# Patient Record
Sex: FEMALE | Race: WHITE | Employment: FULL TIME | URBAN - METROPOLITAN AREA
[De-identification: names, ages, dates, MRNs, and addresses within clinical notes are randomized per-mention and may not be internally consistent; named-entity substitution may affect disease eponyms.]

---

## 2018-11-16 ENCOUNTER — HOSPITAL ENCOUNTER (OUTPATIENT)
Dept: ULTRASOUND IMAGING | Age: 43
Discharge: HOME | End: 2018-11-16
Payer: COMMERCIAL

## 2018-11-16 DIAGNOSIS — R10.9 FLANK PAIN: ICD-10-CM

## 2018-11-16 PROCEDURE — 76770 US EXAM ABDO BACK WALL COMP: CPT

## 2020-03-05 ENCOUNTER — HOSPITAL ENCOUNTER (OUTPATIENT)
Dept: ULTRASOUND IMAGING | Age: 45
Discharge: HOME | End: 2020-03-05
Payer: COMMERCIAL

## 2020-03-05 PROCEDURE — 76770 US EXAM ABDO BACK WALL COMP: CPT

## 2020-07-06 ENCOUNTER — HOSPITAL ENCOUNTER (INPATIENT)
Age: 45
LOS: 3 days | Discharge: HOME | DRG: 897 | End: 2020-07-09
Attending: EMERGENCY MEDICINE | Admitting: INTERNAL MEDICINE
Payer: COMMERCIAL

## 2020-07-06 PROBLEM — F10.239 ALCOHOL DEPENDENCE WITH WITHDRAWAL (HCC): Status: ACTIVE | Noted: 2020-07-06

## 2020-07-06 LAB
A/G RATIO: 2.1 (ref 1.1–2.2)
ACETAMINOPHEN LEVEL: <5 UG/ML (ref 10–30)
ALBUMIN SERPL-MCNC: 4.6 G/DL (ref 3.4–5)
ALP BLD-CCNC: 119 U/L (ref 40–129)
ALT SERPL-CCNC: 96 U/L (ref 10–40)
AMPHETAMINE SCREEN, URINE: ABNORMAL
ANION GAP SERPL CALCULATED.3IONS-SCNC: 14 MMOL/L (ref 3–16)
AST SERPL-CCNC: 84 U/L (ref 15–37)
BACTERIA: ABNORMAL /HPF
BARBITURATE SCREEN URINE: ABNORMAL
BASOPHILS ABSOLUTE: 0.1 K/UL (ref 0–0.2)
BASOPHILS RELATIVE PERCENT: 0.9 %
BENZODIAZEPINE SCREEN, URINE: ABNORMAL
BILIRUB SERPL-MCNC: 0.3 MG/DL (ref 0–1)
BILIRUBIN URINE: NEGATIVE
BLOOD, URINE: NEGATIVE
BUN BLDV-MCNC: 9 MG/DL (ref 7–20)
CALCIUM SERPL-MCNC: 9 MG/DL (ref 8.3–10.6)
CANNABINOID SCREEN URINE: POSITIVE
CHLORIDE BLD-SCNC: 100 MMOL/L (ref 99–110)
CLARITY: CLEAR
CO2: 21 MMOL/L (ref 21–32)
COCAINE METABOLITE SCREEN URINE: ABNORMAL
COLOR: YELLOW
CREAT SERPL-MCNC: 0.7 MG/DL (ref 0.6–1.1)
EOSINOPHILS ABSOLUTE: 0.3 K/UL (ref 0–0.6)
EOSINOPHILS RELATIVE PERCENT: 4.4 %
EPITHELIAL CELLS, UA: ABNORMAL /HPF (ref 0–5)
ETHANOL: 122 MG/DL (ref 0–0.08)
ETHANOL: 34 MG/DL (ref 0–0.08)
GFR AFRICAN AMERICAN: >60
GFR NON-AFRICAN AMERICAN: >60
GLOBULIN: 2.2 G/DL
GLUCOSE BLD-MCNC: 102 MG/DL (ref 70–99)
GLUCOSE URINE: NEGATIVE MG/DL
HCT VFR BLD CALC: 41.8 % (ref 36–48)
HEMOGLOBIN: 14.2 G/DL (ref 12–16)
KETONES, URINE: NEGATIVE MG/DL
LEUKOCYTE ESTERASE, URINE: ABNORMAL
LYMPHOCYTES ABSOLUTE: 1.6 K/UL (ref 1–5.1)
LYMPHOCYTES RELATIVE PERCENT: 22 %
Lab: ABNORMAL
MCH RBC QN AUTO: 30.7 PG (ref 26–34)
MCHC RBC AUTO-ENTMCNC: 33.9 G/DL (ref 31–36)
MCV RBC AUTO: 90.4 FL (ref 80–100)
METHADONE SCREEN, URINE: ABNORMAL
MICROSCOPIC EXAMINATION: YES
MONOCYTES ABSOLUTE: 0.6 K/UL (ref 0–1.3)
MONOCYTES RELATIVE PERCENT: 8.1 %
NEUTROPHILS ABSOLUTE: 4.6 K/UL (ref 1.7–7.7)
NEUTROPHILS RELATIVE PERCENT: 64.6 %
NITRITE, URINE: NEGATIVE
OPIATE SCREEN URINE: ABNORMAL
OXYCODONE URINE: ABNORMAL
PDW BLD-RTO: 12.7 % (ref 12.4–15.4)
PH UA: 7
PH UA: 7 (ref 5–8)
PHENCYCLIDINE SCREEN URINE: ABNORMAL
PLATELET # BLD: 252 K/UL (ref 135–450)
PMV BLD AUTO: 9.6 FL (ref 5–10.5)
POTASSIUM SERPL-SCNC: 3.8 MMOL/L (ref 3.5–5.1)
PROPOXYPHENE SCREEN: ABNORMAL
PROTEIN UA: NEGATIVE MG/DL
RBC # BLD: 4.63 M/UL (ref 4–5.2)
RBC UA: ABNORMAL /HPF (ref 0–4)
SALICYLATE, SERUM: <0.3 MG/DL (ref 15–30)
SODIUM BLD-SCNC: 135 MMOL/L (ref 136–145)
SPECIFIC GRAVITY UA: 1.02 (ref 1–1.03)
TOTAL PROTEIN: 6.8 G/DL (ref 6.4–8.2)
URINE REFLEX TO CULTURE: ABNORMAL
URINE TYPE: ABNORMAL
UROBILINOGEN, URINE: 0.2 E.U./DL
WBC # BLD: 7.2 K/UL (ref 4–11)
WBC UA: ABNORMAL /HPF (ref 0–5)

## 2020-07-06 PROCEDURE — 80053 COMPREHEN METABOLIC PANEL: CPT

## 2020-07-06 PROCEDURE — 85025 COMPLETE CBC W/AUTO DIFF WBC: CPT

## 2020-07-06 PROCEDURE — 6370000000 HC RX 637 (ALT 250 FOR IP): Performed by: PHYSICIAN ASSISTANT

## 2020-07-06 PROCEDURE — G0480 DRUG TEST DEF 1-7 CLASSES: HCPCS

## 2020-07-06 PROCEDURE — 1200000000 HC SEMI PRIVATE

## 2020-07-06 PROCEDURE — 36415 COLL VENOUS BLD VENIPUNCTURE: CPT

## 2020-07-06 PROCEDURE — 81001 URINALYSIS AUTO W/SCOPE: CPT

## 2020-07-06 PROCEDURE — 80307 DRUG TEST PRSMV CHEM ANLYZR: CPT

## 2020-07-06 PROCEDURE — 99285 EMERGENCY DEPT VISIT HI MDM: CPT

## 2020-07-06 PROCEDURE — 85610 PROTHROMBIN TIME: CPT

## 2020-07-06 RX ORDER — LORAZEPAM 1 MG/1
4 TABLET ORAL
Status: DISCONTINUED | OUTPATIENT
Start: 2020-07-06 | End: 2020-07-07

## 2020-07-06 RX ORDER — LORAZEPAM 1 MG/1
3 TABLET ORAL
Status: DISCONTINUED | OUTPATIENT
Start: 2020-07-06 | End: 2020-07-07

## 2020-07-06 RX ORDER — SODIUM CHLORIDE 0.9 % (FLUSH) 0.9 %
10 SYRINGE (ML) INJECTION PRN
Status: DISCONTINUED | OUTPATIENT
Start: 2020-07-06 | End: 2020-07-07

## 2020-07-06 RX ORDER — SODIUM CHLORIDE 0.9 % (FLUSH) 0.9 %
10 SYRINGE (ML) INJECTION EVERY 12 HOURS SCHEDULED
Status: DISCONTINUED | OUTPATIENT
Start: 2020-07-06 | End: 2020-07-07

## 2020-07-06 RX ORDER — LORAZEPAM 1 MG/1
2 TABLET ORAL
Status: DISCONTINUED | OUTPATIENT
Start: 2020-07-06 | End: 2020-07-07

## 2020-07-06 RX ORDER — LORAZEPAM 2 MG/ML
1 INJECTION INTRAMUSCULAR
Status: DISCONTINUED | OUTPATIENT
Start: 2020-07-06 | End: 2020-07-07

## 2020-07-06 RX ORDER — LORAZEPAM 1 MG/1
1 TABLET ORAL
Status: DISCONTINUED | OUTPATIENT
Start: 2020-07-06 | End: 2020-07-07

## 2020-07-06 RX ORDER — LORAZEPAM 2 MG/ML
3 INJECTION INTRAMUSCULAR
Status: DISCONTINUED | OUTPATIENT
Start: 2020-07-06 | End: 2020-07-07

## 2020-07-06 RX ORDER — FOLIC ACID 1 MG/1
1 TABLET ORAL DAILY
Status: DISCONTINUED | OUTPATIENT
Start: 2020-07-06 | End: 2020-07-09 | Stop reason: HOSPADM

## 2020-07-06 RX ORDER — M-VIT,TX,IRON,MINS/CALC/FOLIC 27MG-0.4MG
1 TABLET ORAL DAILY
Status: DISCONTINUED | OUTPATIENT
Start: 2020-07-06 | End: 2020-07-09 | Stop reason: HOSPADM

## 2020-07-06 RX ORDER — LORAZEPAM 2 MG/ML
2 INJECTION INTRAMUSCULAR
Status: DISCONTINUED | OUTPATIENT
Start: 2020-07-06 | End: 2020-07-07

## 2020-07-06 RX ORDER — LORAZEPAM 2 MG/ML
4 INJECTION INTRAMUSCULAR
Status: DISCONTINUED | OUTPATIENT
Start: 2020-07-06 | End: 2020-07-07

## 2020-07-06 RX ORDER — CHLORDIAZEPOXIDE HYDROCHLORIDE 5 MG/1
10 CAPSULE, GELATIN COATED ORAL 4 TIMES DAILY
Status: DISCONTINUED | OUTPATIENT
Start: 2020-07-06 | End: 2020-07-09 | Stop reason: HOSPADM

## 2020-07-06 RX ORDER — THIAMINE MONONITRATE (VIT B1) 100 MG
100 TABLET ORAL DAILY
Status: DISCONTINUED | OUTPATIENT
Start: 2020-07-06 | End: 2020-07-09 | Stop reason: HOSPADM

## 2020-07-06 RX ADMIN — Medication 100 MG: at 19:33

## 2020-07-06 RX ADMIN — MULTIPLE VITAMINS W/ MINERALS TAB 1 TABLET: TAB at 19:33

## 2020-07-06 RX ADMIN — FOLIC ACID 1 MG: 1 TABLET ORAL at 19:34

## 2020-07-06 ASSESSMENT — PAIN DESCRIPTION - LOCATION: LOCATION: BACK

## 2020-07-06 ASSESSMENT — PAIN SCALES - GENERAL: PAINLEVEL_OUTOF10: 7

## 2020-07-06 ASSESSMENT — PAIN DESCRIPTION - DESCRIPTORS: DESCRIPTORS: ACHING

## 2020-07-06 ASSESSMENT — PAIN DESCRIPTION - FREQUENCY: FREQUENCY: CONTINUOUS
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
## 2020-07-06 NOTE — ED NOTES
Placed call to case management left message name and phone number and the doc name     Macy Ogden  07/06/20 [phone]

## 2020-07-07 LAB
A/G RATIO: 1.7 (ref 1.1–2.2)
ALBUMIN SERPL-MCNC: 3.8 G/DL (ref 3.4–5)
ALP BLD-CCNC: 103 U/L (ref 40–129)
ALT SERPL-CCNC: 76 U/L (ref 10–40)
ANION GAP SERPL CALCULATED.3IONS-SCNC: 12 MMOL/L (ref 3–16)
AST SERPL-CCNC: 55 U/L (ref 15–37)
BASOPHILS ABSOLUTE: 0 K/UL (ref 0–0.2)
BASOPHILS RELATIVE PERCENT: 0.7 %
BILIRUB SERPL-MCNC: 0.6 MG/DL (ref 0–1)
BUN BLDV-MCNC: 10 MG/DL (ref 7–20)
CALCIUM SERPL-MCNC: 8.2 MG/DL (ref 8.3–10.6)
CHLORIDE BLD-SCNC: 103 MMOL/L (ref 99–110)
CO2: 21 MMOL/L (ref 21–32)
CREAT SERPL-MCNC: 0.6 MG/DL (ref 0.6–1.1)
EOSINOPHILS ABSOLUTE: 0.3 K/UL (ref 0–0.6)
EOSINOPHILS RELATIVE PERCENT: 4.9 %
GFR AFRICAN AMERICAN: >60
GFR NON-AFRICAN AMERICAN: >60
GLOBULIN: 2.3 G/DL
GLUCOSE BLD-MCNC: 99 MG/DL (ref 70–99)
HCT VFR BLD CALC: 38.6 % (ref 36–48)
HEMOGLOBIN: 13.1 G/DL (ref 12–16)
INR BLD: 0.91 (ref 0.86–1.14)
LYMPHOCYTES ABSOLUTE: 2 K/UL (ref 1–5.1)
LYMPHOCYTES RELATIVE PERCENT: 31.1 %
MCH RBC QN AUTO: 30.7 PG (ref 26–34)
MCHC RBC AUTO-ENTMCNC: 33.8 G/DL (ref 31–36)
MCV RBC AUTO: 90.6 FL (ref 80–100)
MONOCYTES ABSOLUTE: 0.7 K/UL (ref 0–1.3)
MONOCYTES RELATIVE PERCENT: 10.5 %
NEUTROPHILS ABSOLUTE: 3.3 K/UL (ref 1.7–7.7)
NEUTROPHILS RELATIVE PERCENT: 52.8 %
PDW BLD-RTO: 12.8 % (ref 12.4–15.4)
PLATELET # BLD: 226 K/UL (ref 135–450)
PMV BLD AUTO: 9.3 FL (ref 5–10.5)
POTASSIUM REFLEX MAGNESIUM: 3.6 MMOL/L (ref 3.5–5.1)
PROTHROMBIN TIME: 10.5 SEC (ref 10–13.2)
RBC # BLD: 4.26 M/UL (ref 4–5.2)
SODIUM BLD-SCNC: 136 MMOL/L (ref 136–145)
TOTAL PROTEIN: 6.1 G/DL (ref 6.4–8.2)
WBC # BLD: 6.3 K/UL (ref 4–11)

## 2020-07-07 PROCEDURE — 80053 COMPREHEN METABOLIC PANEL: CPT

## 2020-07-07 PROCEDURE — 6370000000 HC RX 637 (ALT 250 FOR IP): Performed by: INTERNAL MEDICINE

## 2020-07-07 PROCEDURE — 36415 COLL VENOUS BLD VENIPUNCTURE: CPT

## 2020-07-07 PROCEDURE — 99232 SBSQ HOSP IP/OBS MODERATE 35: CPT | Performed by: INTERNAL MEDICINE

## 2020-07-07 PROCEDURE — 2580000003 HC RX 258: Performed by: INTERNAL MEDICINE

## 2020-07-07 PROCEDURE — 85025 COMPLETE CBC W/AUTO DIFF WBC: CPT

## 2020-07-07 PROCEDURE — 1200000000 HC SEMI PRIVATE

## 2020-07-07 PROCEDURE — 6360000002 HC RX W HCPCS: Performed by: INTERNAL MEDICINE

## 2020-07-07 RX ORDER — IBUPROFEN 400 MG/1
400 TABLET ORAL EVERY 6 HOURS PRN
Status: DISCONTINUED | OUTPATIENT
Start: 2020-07-07 | End: 2020-07-09 | Stop reason: HOSPADM

## 2020-07-07 RX ORDER — LORAZEPAM 2 MG/ML
3 INJECTION INTRAMUSCULAR
Status: DISCONTINUED | OUTPATIENT
Start: 2020-07-07 | End: 2020-07-09 | Stop reason: HOSPADM

## 2020-07-07 RX ORDER — LORAZEPAM 2 MG/ML
1 INJECTION INTRAMUSCULAR
Status: DISCONTINUED | OUTPATIENT
Start: 2020-07-07 | End: 2020-07-09 | Stop reason: HOSPADM

## 2020-07-07 RX ORDER — LORAZEPAM 2 MG/1
4 TABLET ORAL
Status: DISCONTINUED | OUTPATIENT
Start: 2020-07-07 | End: 2020-07-09 | Stop reason: HOSPADM

## 2020-07-07 RX ORDER — LORAZEPAM 2 MG/ML
2 INJECTION INTRAMUSCULAR
Status: DISCONTINUED | OUTPATIENT
Start: 2020-07-07 | End: 2020-07-09 | Stop reason: HOSPADM

## 2020-07-07 RX ORDER — POTASSIUM CHLORIDE 20 MEQ/1
40 TABLET, EXTENDED RELEASE ORAL PRN
Status: DISCONTINUED | OUTPATIENT
Start: 2020-07-07 | End: 2020-07-09 | Stop reason: HOSPADM

## 2020-07-07 RX ORDER — LORAZEPAM 2 MG/1
2 TABLET ORAL
Status: DISCONTINUED | OUTPATIENT
Start: 2020-07-07 | End: 2020-07-09 | Stop reason: HOSPADM

## 2020-07-07 RX ORDER — SODIUM CHLORIDE 0.9 % (FLUSH) 0.9 %
10 SYRINGE (ML) INJECTION EVERY 12 HOURS SCHEDULED
Status: DISCONTINUED | OUTPATIENT
Start: 2020-07-07 | End: 2020-07-09 | Stop reason: HOSPADM

## 2020-07-07 RX ORDER — MAGNESIUM SULFATE 1 G/100ML
1 INJECTION INTRAVENOUS PRN
Status: DISCONTINUED | OUTPATIENT
Start: 2020-07-07 | End: 2020-07-09 | Stop reason: HOSPADM

## 2020-07-07 RX ORDER — SODIUM CHLORIDE 9 MG/ML
INJECTION, SOLUTION INTRAVENOUS CONTINUOUS
Status: DISCONTINUED | OUTPATIENT
Start: 2020-07-07 | End: 2020-07-07

## 2020-07-07 RX ORDER — LORAZEPAM 1 MG/1
1 TABLET ORAL
Status: DISCONTINUED | OUTPATIENT
Start: 2020-07-07 | End: 2020-07-09 | Stop reason: HOSPADM

## 2020-07-07 RX ORDER — POTASSIUM CHLORIDE 7.45 MG/ML
10 INJECTION INTRAVENOUS PRN
Status: DISCONTINUED | OUTPATIENT
Start: 2020-07-07 | End: 2020-07-09 | Stop reason: HOSPADM

## 2020-07-07 RX ORDER — LORAZEPAM 2 MG/ML
4 INJECTION INTRAMUSCULAR
Status: DISCONTINUED | OUTPATIENT
Start: 2020-07-07 | End: 2020-07-09 | Stop reason: HOSPADM

## 2020-07-07 RX ORDER — SODIUM CHLORIDE 0.9 % (FLUSH) 0.9 %
10 SYRINGE (ML) INJECTION PRN
Status: DISCONTINUED | OUTPATIENT
Start: 2020-07-07 | End: 2020-07-09 | Stop reason: HOSPADM

## 2020-07-07 RX ORDER — ONDANSETRON 2 MG/ML
4 INJECTION INTRAMUSCULAR; INTRAVENOUS EVERY 6 HOURS PRN
Status: DISCONTINUED | OUTPATIENT
Start: 2020-07-07 | End: 2020-07-09 | Stop reason: HOSPADM

## 2020-07-07 RX ORDER — POLYETHYLENE GLYCOL 3350 17 G/17G
17 POWDER, FOR SOLUTION ORAL DAILY PRN
Status: DISCONTINUED | OUTPATIENT
Start: 2020-07-07 | End: 2020-07-09 | Stop reason: HOSPADM

## 2020-07-07 RX ORDER — HYDROXYZINE HYDROCHLORIDE 10 MG/1
10 TABLET, FILM COATED ORAL 3 TIMES DAILY PRN
Status: DISCONTINUED | OUTPATIENT
Start: 2020-07-07 | End: 2020-07-09 | Stop reason: HOSPADM

## 2020-07-07 RX ADMIN — IBUPROFEN 400 MG: 400 TABLET, FILM COATED ORAL at 01:03

## 2020-07-07 RX ADMIN — LORAZEPAM 1 MG: 1 TABLET ORAL at 23:06

## 2020-07-07 RX ADMIN — HYDROXYZINE HYDROCHLORIDE 10 MG: 10 TABLET, FILM COATED ORAL at 21:21

## 2020-07-07 RX ADMIN — CHLORDIAZEPOXIDE HYDROCHLORIDE 10 MG: 5 CAPSULE ORAL at 21:21

## 2020-07-07 RX ADMIN — Medication 10 ML: at 01:04

## 2020-07-07 RX ADMIN — LORAZEPAM 1 MG: 1 TABLET ORAL at 01:03

## 2020-07-07 RX ADMIN — ENOXAPARIN SODIUM 40 MG: 40 INJECTION SUBCUTANEOUS at 09:02

## 2020-07-07 RX ADMIN — HYDROXYZINE HYDROCHLORIDE 10 MG: 10 TABLET, FILM COATED ORAL at 01:03

## 2020-07-07 RX ADMIN — LORAZEPAM 3 MG: 2 TABLET ORAL at 21:20

## 2020-07-07 RX ADMIN — CHLORDIAZEPOXIDE HYDROCHLORIDE 10 MG: 5 CAPSULE ORAL at 12:24

## 2020-07-07 RX ADMIN — Medication 100 MG: at 09:02

## 2020-07-07 RX ADMIN — CHLORDIAZEPOXIDE HYDROCHLORIDE 10 MG: 5 CAPSULE ORAL at 01:00

## 2020-07-07 RX ADMIN — FOLIC ACID 1 MG: 1 TABLET ORAL at 09:01

## 2020-07-07 RX ADMIN — Medication 10 ML: at 21:21

## 2020-07-07 RX ADMIN — CHLORDIAZEPOXIDE HYDROCHLORIDE 10 MG: 5 CAPSULE ORAL at 18:28

## 2020-07-07 RX ADMIN — IBUPROFEN 400 MG: 400 TABLET, FILM COATED ORAL at 21:21

## 2020-07-07 RX ADMIN — LORAZEPAM 2 MG: 2 TABLET ORAL at 16:06

## 2020-07-07 RX ADMIN — CHLORDIAZEPOXIDE HYDROCHLORIDE 10 MG: 5 CAPSULE ORAL at 09:01

## 2020-07-07 RX ADMIN — MULTIPLE VITAMINS W/ MINERALS TAB 1 TABLET: TAB at 09:02

## 2020-07-07 RX ADMIN — SODIUM CHLORIDE: 9 INJECTION, SOLUTION INTRAVENOUS at 01:03

## 2020-07-07 ASSESSMENT — PAIN DESCRIPTION - DESCRIPTORS
DESCRIPTORS: HEADACHE
DESCRIPTORS: HEADACHE

## 2020-07-07 ASSESSMENT — PAIN DESCRIPTION - PAIN TYPE
TYPE: ACUTE PAIN
TYPE: ACUTE PAIN

## 2020-07-07 ASSESSMENT — PAIN SCALES - GENERAL
PAINLEVEL_OUTOF10: 5
PAINLEVEL_OUTOF10: 3

## 2020-07-07 ASSESSMENT — PAIN DESCRIPTION - FREQUENCY
FREQUENCY: INTERMITTENT
FREQUENCY: INTERMITTENT

## 2020-07-07 ASSESSMENT — PAIN - FUNCTIONAL ASSESSMENT
PAIN_FUNCTIONAL_ASSESSMENT: ACTIVITIES ARE NOT PREVENTED
PAIN_FUNCTIONAL_ASSESSMENT: ACTIVITIES ARE NOT PREVENTED

## 2020-07-07 ASSESSMENT — PAIN DESCRIPTION - LOCATION
LOCATION: HEAD
LOCATION: HEAD

## 2020-07-07 NOTE — PROGRESS NOTES
Pt to 2W at this time, A&O x4, gait stable. Call light within reach, seizure precautions in place, bed alarm refused by pt. Will continue to monitor.   Burgess Barfield RN

## 2020-07-07 NOTE — PLAN OF CARE
Problem: Discharge Planning:  Goal: Discharged to appropriate level of care  Description: Discharged to appropriate level of care  7/7/2020 1938 by Jose Calabrese RN  Outcome: Ongoing  7/6/1082 2274 by Radha Morgan RN  Outcome: Ongoing     Problem: Fluid Volume - Deficit:  Goal: Absence of fluid volume deficit signs and symptoms  Description: Absence of fluid volume deficit signs and symptoms  7/7/2020 1938 by Jose Calabrese RN  Outcome: Ongoing  5/1/8013 7895 by Radha Morgan RN  Outcome: Ongoing     Problem: Nutrition Deficit:  Goal: Ability to achieve adequate nutritional intake will improve  Description: Ability to achieve adequate nutritional intake will improve  7/7/2020 1938 by Jose Calabrese RN  Outcome: Ongoing  1/1/5774 1411 by Radha Morgan RN  Outcome: Ongoing     Problem: Sleep Pattern Disturbance:  Goal: Appears well-rested  Description: Appears well-rested  7/7/2020 1938 by Jose Calabrese RN  Outcome: Ongoing  4/0/7432 8894 by Radha Morgan RN  Outcome: Ongoing     Problem: Violence - Risk of, Self/Other-Directed:  Goal: Knowledge of developmental care interventions  Description: Absence of violence  7/7/2020 1938 by Jose Calabrese RN  Outcome: Ongoing  8/4/0903 8931 by Radha Morgan RN  Outcome: Ongoing     Problem: Falls - Risk of:  Goal: Will remain free from falls  Description: Will remain free from falls  7/7/2020 1938 by Jose Calabrese RN  Outcome: Ongoing  3/7/4901 2689 by Radha Morgan RN  Outcome: Ongoing  Goal: Absence of physical injury  Description: Absence of physical injury  7/7/2020 1938 by Jose Calabrese RN  Outcome: Ongoing  4/1/4961 8860 by Radha Morgan RN  Outcome: Ongoing     Problem: Pain:  Goal: Pain level will decrease  Description: Pain level will decrease  7/7/2020 1938 by Jose Calabrese RN  Outcome: Ongoing  3/9/9217 6872 by Radha Morgan RN  Outcome: Ongoing  Goal: Control of acute pain  Description: Control of acute pain  7/7/2020 1938 by Jose Calabrese RN  Outcome: Ongoing  2/8/6647 4680 by Brenda Whitney RN  Outcome: Ongoing  Goal: Control of chronic pain  Description: Control of chronic pain  7/7/2020 1938 by oRby Lombardo RN  Outcome: Ongoing  2/7/2628 1661 by Brenda Whitney RN  Outcome: Ongoing

## 2020-07-07 NOTE — PROGRESS NOTES
Spoke with Blas Greco from [facility] and made him aware that the patient had checked herself in to medically detox. Per Blas Greco an outreach advocate will visit patient before discharge.

## 2020-07-07 NOTE — ED NOTES
Report given to floor nurse, patient taken up to floor via wheelchair at this time.       Marixa Lin RN  07/07/20 1957

## 2020-07-07 NOTE — PLAN OF CARE
Problem: Discharge Planning:  Goal: Discharged to appropriate level of care  Description: Discharged to appropriate level of care  Outcome: Ongoing     Problem: Fluid Volume - Deficit:  Goal: Absence of fluid volume deficit signs and symptoms  Description: Absence of fluid volume deficit signs and symptoms  Outcome: Ongoing     Problem: Nutrition Deficit:  Goal: Ability to achieve adequate nutritional intake will improve  Description: Ability to achieve adequate nutritional intake will improve  Outcome: Ongoing     Problem: Sleep Pattern Disturbance:  Goal: Appears well-rested  Description: Appears well-rested  Outcome: Ongoing     Problem: Violence - Risk of, Self/Other-Directed:  Goal: Knowledge of developmental care interventions  Description: Absence of violence  Outcome: Ongoing     Problem: Falls - Risk of:  Goal: Will remain free from falls  Description: Will remain free from falls  Outcome: Ongoing  Goal: Absence of physical injury  Description: Absence of physical injury  Outcome: Ongoing

## 2020-07-07 NOTE — H&P
Hospital Medicine History & Physical      PCP: RENETTA Benoit    Date of Service: Pt seen/examined on 7/6/20 and admitted on 7/6/20 to Inpatient    Chief Complaint   Patient presents with    Delirium Tremens (DTS)       History Of Present Illness: The patient is a 53 y.o. female with PMH below, presents with alcohol withdrawal, anxiety, tremulousness, nausea. Patient reports that she has drank approximately half 1/5 of hard liquor daily for the past 3 years. Her last drink was immediately prior to arrival at approximately 3 PM.  She reports she has never actually done full detox but gets shaky and anxious after about half a day of not drinking with prior attempts. When this happens she goes back to drinking to relieve the symptoms. She has been seen by [facility] and was referred here for medical detox. Currently describes anxiety and feeling shaky and nausea. She declined antiemetics in the ER. BAC was 122 at arrival and repeat approximately 3 hours later was 34. CIWA scores have been 4 and 7 so far today. Past Medical History:    No past medical history on file. Past Surgical History:        Procedure Laterality Date    BREAST SURGERY         Medications Prior to Admission:    Prior to Admission medications    Medication Sig Start Date End Date Taking? Authorizing Provider   ibuprofen (ADVIL;MOTRIN) 800 MG tablet Take 800 mg by mouth every 6 hours as needed for Pain. Historical Provider, MD   ibuprofen (IBU) 800 MG tablet Take 1 tablet by mouth every 8 hours as needed for Pain. 1/23/14   Saeid Cabello MD       Allergies:  Patient has no known allergies. Social History:    TOBACCO:   reports that she has never smoked. She has never used smokeless tobacco.  ETOH:   reports current alcohol use. Family History:  Reviewed in detail and negative for DM, Early CAD, Cancer (except as below). Positive as follows:    No family history on file.     REVIEW OF SYSTEMS:   Pertinent positives/negatives as follows: alcohol withdrawal, anxiety, tremulousness, nausea, and as discussed in HPI, otherwise a complete ROS performed and all other systems are negative  PHYSICAL EXAM PERFORMED:    BP (!) 142/86   Pulse 96   Temp 97.4 °F (36.3 °C)   Resp 16   Ht 5' 4\" (1.626 m)   Wt 185 lb (83.9 kg)   SpO2 97%   BMI 31.76 kg/m²     GEN:  A&Ox3, tremulous. HEENT:  NC/AT,EOMI, semi-dry MM, no erythema/exudates or visible masses. CVS:  Normal S1,S2. Mild tachy RRR. Without M/G/R.   LUNG:   CTA-B. no wheezes, rales or rhonchi  ABD:  Soft, ND/NT, BS+ x4. Without G/R.  EXT: 2+ pulses, no c/c/e. Brisk cap refill. PSY:  Thought process intact, affect anxious. SUSAN:  CN III-XII intact, moves all 4 spontaneously, sensory grossly intact. Mildly tremulous  SKIN: No rash or lesions on visible skin. CBC:  Recent Labs     07/06/20  1755   WBC 7.2   HGB 14.2   HCT 41.8           RENAL  Recent Labs     07/06/20  1755   *   K 3.8      CO2 21   BUN 9   CREATININE 0.7   GLUCOSE 102*     LFT'S:  Recent Labs     07/06/20  1755   AST 84*   ALT 96*   BILITOT 0.3   ALKPHOS 119       COAG:  INR added on. U/A:  Recent Labs     07/06/20  2200   LEUKOCYTESUR TRACE*   BACTERIA 2+*   WBCUA 3-5   COLORU Yellow   RBCUA 3-4   CLARITYU Clear   SPECGRAV 1.020   BLOODU Negative   GLUCOSEU Negative       Urine Tox Screen:  Recent Labs     07/06/20  2200   LABAMPH Neg   BARBSCNU Neg   LABBENZ Neg   CANSU POSITIVE*   COCAIMETSCRU Neg   OPIATESCREENURINE Neg   PHENCYCLIDINESCREENURINE Neg   LABMETH Neg   PROPOX Neg   PHUR 7.0  7.0   OXYCODONEUR Neg     PHYSICIAN CERTIFICATION  I certify that Nichole Carpenter is expected to be hospitalized for 2 midnights based on the following assessment and plan:    ASSESSMENT/PLAN:  1. Alcohol dependence with early withdrawal sx (nausea, anxiety, tremulousness), KAROLYN 122 w/ repeat of 34 ~ 3 hours later.   No known Hx of seizures/delirium but has had anxiety and tremors after ~ 1/2 day of attempted cessation in past.  CIWA with Ativan PRN and [facility] Librium. PO MVT, folate/thiamine QD. Fall and Seizure precautions. Denies SI/HI. She was referred for medical detox by [address] [facility] c/s.   2. Elevated LFT's, mild - see above, no previous for comparison, repeat in am.  Atypical pattern for ETOH. Monitor. DVT Prophylaxis: Lx  Diet: gen  Code Status: Full Code   PT/OT Eval Status: Will order if needed and as patient condition allows  Dispo - Admit to inpatient Ai Ivy MD    Thank you RENETTA Almazan for the opportunity to be involved in this patient's care. If you have any questions or concerns please feel free to contact me via the [employer] at [phone]. This chart was generated using the [address] [place] [address] dictation system. I created this record but it may contain dictation errors given the limitations of this technology.

## 2020-07-07 NOTE — PROGRESS NOTES
Per patient no information is to be given out about her stay except to Connor Florence her emergency contact. Pt did request that RN contact Veronique Frankel at [facility] and give update.

## 2020-07-07 NOTE — PROGRESS NOTES
Shift assessment complete - see flow sheet. Scheduled medications given see MAR. Patient scored a zero on CIWA and is currently in stable condition. Patient denies any pain at this time and she currently has no further needs, call light is in reach and the bed is in the lowest position. Will continue to monitor.

## 2020-07-07 NOTE — PLAN OF CARE
Problem: Discharge Planning:  Goal: Discharged to appropriate level of care  Description: Discharged to appropriate level of care  1/4/0690 2960 by Jose Ordonez RN  Outcome: Ongoing  7/7/2020 0128 by Maria Dewey RN  Outcome: Ongoing     Problem: Fluid Volume - Deficit:  Goal: Absence of fluid volume deficit signs and symptoms  Description: Absence of fluid volume deficit signs and symptoms  1/8/3480 9507 by Jose Ordonez RN  Outcome: Ongoing  7/7/2020 0128 by Maria Dewey RN  Outcome: Ongoing     Problem: Nutrition Deficit:  Goal: Ability to achieve adequate nutritional intake will improve  Description: Ability to achieve adequate nutritional intake will improve  2/9/8335 4388 by Jose Ordonez RN  Outcome: Ongoing  7/7/2020 0128 by Maria Dewey RN  Outcome: Ongoing     Problem: Sleep Pattern Disturbance:  Goal: Appears well-rested  Description: Appears well-rested  9/9/2125 5652 by Jose Ordonez RN  Outcome: Ongoing  7/7/2020 0128 by Maria Dewey RN  Outcome: Ongoing     Problem: Violence - Risk of, Self/Other-Directed:  Goal: Knowledge of developmental care interventions  Description: Absence of violence  7/7/9766 9047 by Jose Ordonez RN  Outcome: Ongoing  7/7/2020 0128 by Maria Dewey RN  Outcome: Ongoing     Problem: Falls - Risk of:  Goal: Will remain free from falls  Description: Will remain free from falls  0/8/3398 8275 by Jose Ordonez RN  Outcome: Ongoing  7/7/2020 0128 by Maria Dewey RN  Outcome: Ongoing  Goal: Absence of physical injury  Description: Absence of physical injury  4/1/6877 9601 by Jose Ordonez RN  Outcome: Ongoing  7/7/2020 0128 by Maria Dewey RN  Outcome: Ongoing     Problem: Pain:  Goal: Pain level will decrease  Description: Pain level will decrease  Outcome: Ongoing  Goal: Control of acute pain  Description: Control of acute pain  Outcome: Ongoing  Goal: Control of chronic pain  Description: Control of chronic pain  Outcome: Ongoing

## 2020-07-07 NOTE — CARE COORDINATION
Case Management Assessment  Initial Evaluation    Date/Time of Evaluation: 7/7/2020 10:30 AM  Assessment Completed by: Valery Duran    Patient Name: Issac Singletary  YOB: 1975  Diagnosis: Alcohol dependence with withdrawal [facility]) [F10.239]  Date / Time: 7/6/2020  5:37 PM  Admission status/Date: 07/06/20    Chart Reviewed: Yes      Patient Interviewed: Yes   Family Interviewed:  No      Hospitalization in the last 30 days:  No    Contacts  :     Relationship to Patient:   Phone Number:    Alternate Contact:     Relationship to Patient:     Phone Number:    Met with:    Current PCP Rebecca JACKSON    Financial  Medicare    ADLS  Support Systems: /, Other (Comment)([facility] counselor Carin Esteban)  Transportation: self    Meal Preparation: self    Housing  Home Environment: [facility]  Steps: n/a  Plans to Return to Present Housing: Yes  Other Identified Issues: Sunny Álvarezn  Currently active with [address] : No  Type of Home Care Services: None  Passport/Waiver : No  :                      Phone Number:    Passport/Waiver Services: n/a          Durable Medical Equipment   DME Provider: n/a  Equipment: n/aWalker___Cane___RTS___ BSC___Shower Chair___  02__ at ____Liter(s)---Uses________HHN___ CPAP___ BiPap___ Hospital Bed___W/C____Other________      Has Home O2 in place on admit:  No    If above answer is No ---is pt presently on O2 during hospitalization:  No  if yes CM to follow for potential DC O2 need  Informed of need to bring portable home O2 tank on day of discharge for nursing to connect prior to leaving:   Not Indicated  Verbalized agreement/Understanding:   Not Indicated    Community Service Affiliation  Dialysis:  No    · Name:  · Location  · Dialysis Schedule:  · Phone:   · Fax:     Outpatient PT/OT: No    Cancer Center: No     CHF Clinic: No     Pulmonary Rehab: No  Pain Clinic: No  Community Mental Health: Yes - [facility]    Wound Clinic: No COVID SCREENING: No       Other: n/a    The Plan for Transition of Care is related to the following treatment goals: return home with kids. Active with [facility]    The Patient and/or patient representative Pt was provided with a choice of provider and agrees   with the discharge plan. [x] Yes [] No    DISCHARGE PLAN: Chart reviewed. Met with Pt at bedside. Pt I-pta. Active with [facility] - Mohit King). Sent to [facility] for detox. Given Resource folder. Denies needs. CM to follow. Explained Case Management role/services.

## 2020-07-07 NOTE — PROGRESS NOTES
Pt refuses 4 eye skin assessment at this time, call light within reach. Will continue to monitor.   Felicity France RN

## 2020-07-08 PROCEDURE — 99232 SBSQ HOSP IP/OBS MODERATE 35: CPT | Performed by: INTERNAL MEDICINE

## 2020-07-08 PROCEDURE — 1200000000 HC SEMI PRIVATE

## 2020-07-08 PROCEDURE — 6360000002 HC RX W HCPCS: Performed by: INTERNAL MEDICINE

## 2020-07-08 PROCEDURE — 6370000000 HC RX 637 (ALT 250 FOR IP): Performed by: INTERNAL MEDICINE

## 2020-07-08 PROCEDURE — 2580000003 HC RX 258: Performed by: INTERNAL MEDICINE

## 2020-07-08 RX ADMIN — Medication 10 ML: at 08:56

## 2020-07-08 RX ADMIN — LORAZEPAM 2 MG: 2 TABLET ORAL at 22:31

## 2020-07-08 RX ADMIN — ENOXAPARIN SODIUM 40 MG: 40 INJECTION SUBCUTANEOUS at 08:56

## 2020-07-08 RX ADMIN — CHLORDIAZEPOXIDE HYDROCHLORIDE 10 MG: 5 CAPSULE ORAL at 22:31

## 2020-07-08 RX ADMIN — LORAZEPAM 2 MG: 2 TABLET ORAL at 01:07

## 2020-07-08 RX ADMIN — FOLIC ACID 1 MG: 1 TABLET ORAL at 08:56

## 2020-07-08 RX ADMIN — CHLORDIAZEPOXIDE HYDROCHLORIDE 10 MG: 5 CAPSULE ORAL at 08:55

## 2020-07-08 RX ADMIN — HYDROXYZINE HYDROCHLORIDE 10 MG: 10 TABLET, FILM COATED ORAL at 08:58

## 2020-07-08 RX ADMIN — Medication 100 MG: at 08:55

## 2020-07-08 RX ADMIN — LORAZEPAM 4 MG: 2 TABLET ORAL at 00:07

## 2020-07-08 RX ADMIN — CHLORDIAZEPOXIDE HYDROCHLORIDE 10 MG: 5 CAPSULE ORAL at 17:55

## 2020-07-08 RX ADMIN — MULTIPLE VITAMINS W/ MINERALS TAB 1 TABLET: TAB at 08:55

## 2020-07-08 RX ADMIN — CHLORDIAZEPOXIDE HYDROCHLORIDE 10 MG: 5 CAPSULE ORAL at 13:12

## 2020-07-08 RX ADMIN — IBUPROFEN 400 MG: 400 TABLET, FILM COATED ORAL at 22:34

## 2020-07-08 ASSESSMENT — PAIN DESCRIPTION - PAIN TYPE: TYPE: ACUTE PAIN

## 2020-07-08 ASSESSMENT — PAIN DESCRIPTION - LOCATION: LOCATION: HEAD

## 2020-07-08 ASSESSMENT — PAIN SCALES - GENERAL: PAINLEVEL_OUTOF10: 7

## 2020-07-08 NOTE — CARE COORDINATION
INTERDISCIPLINARY PLAN OF CARE CONFERENCE    Date/Time: 7/8/2020 9:40 AM  Completed by: Linus Ascencio Case Management      Patient Name:  Matti He  YOB: 1975  Admitting Diagnosis: Alcohol dependence with withdrawal [facility]) [F10.239]     Admit Date/Time:  7/6/2020  5:37 PM    Chart reviewed. Interdisciplinary team met to discuss patient progress and discharge plans. Disciplines included Case Management, Nursing, and Dietitian. Current Status: IP 07/06/2020    PT/OT recommendation:    Anticipated Discharge Date: TBD  Expected D/C Disposition:  Home w/OP tx at [facility]  Confirmed plan with patient and [facility] outreach)  Discharge Plan Comments: ETOH WD on CIWA scale w/Ativan and librium. + Peer support, +resource folder provided.      The Plan for Transition of Care is related to the following treatment goals: home w/op ts at [address] [place] [address] in place on admit: No

## 2020-07-08 NOTE — PROGRESS NOTES
90.4 90.6   RDW 12.7 12.8    226     BMP:   Recent Labs     07/06/20  1755 07/07/20  0553   * 136   K 3.8 3.6    103   CO2 21 21   BUN 9 10   CREATININE 0.7 0.6     BNP: No results for input(s): BNP in the last 72 hours. PT/INR:   Recent Labs     07/06/20 1755   PROTIME 10.5   INR 0.91     APTT:No results for input(s): APTT in the last 72 hours. CARDIAC ENZYMES: No results for input(s): CKMB, CKMBINDEX, TROPONINI in the last 72 hours. Invalid input(s): CKTOTAL;3  FASTING LIPID PANEL:No results found for: CHOL, HDL, TRIG  LIVER PROFILE:   Recent Labs     07/06/20 1755 07/07/20  0553   AST 84* 55*   ALT 96* 76*   BILITOT 0.3 0.6   ALKPHOS 119 103           Assessment & Plan:     Patient Active Problem List    Diagnosis Date Noted    Elevated LFTs     [facility] 07/06/2020    DDD (degenerative disc disease), cervical 02/17/2015    Cervical strain 02/17/2015     Alcohol dependence with withdrawal.   Continue oral Librium. CIWA scale with Ativan. Folate and thiamine replacement. Seizure precautions. Elevated LFTs secondary to alcohol.     DVT prophylaxis         Plans to go to [address] [place] [address]

## 2020-07-08 NOTE — FLOWSHEET NOTE
07/08/20 0006   CIWA-Ar   Nausea and Vomiting 1   Tactile Disturbances 3   Tremor 4   Auditory Disturbances 0   Paroxysmal Sweats 4   Visual Disturbances 0   Anxiety 4   Headache, Fullness in Head 1   Agitation 4   Orientation and Clouding of Sensorium 0   CIWA-Ar Total 21   Pt medicated at this time per PRN CIWA-Ar protocol- see eMAR. Pt extremely anxious/antsy/sweaty at this time. Pt denies further needs, has call light within reach. Will continue to monitor.   Lucas Pate RN

## 2020-07-08 NOTE — FLOWSHEET NOTE
07/07/20 1946   Vital Signs   Temp 98.4 °F (36.9 °C)   Temp Source Oral   Pulse 86   Heart Rate Source Monitor   Resp 16   /84   BP Location Right upper arm   BP Upper/Lower Upper   Patient Position Semi fowlers   Level of Consciousness 0   MEWS Score 1   Oxygen Therapy   SpO2 99 %   O2 Device None (Room air)   Assessment complete- see flowsheets. PRN medications given with nightly meds at this time; PRN meds administered per pt request and PRN order paramaters. PRN ativan given correlating with CIWA-Ar scale; pt aware that this medication is available q1h as she needs it and agrees to call if she needs more. See eMAR and flowsheets for medication/administration info. Pt resting in bed at this time, very anxious and sweating. Call light within reach, bed alarm declined. Will continue to monitor.   Vladimir Li RN

## 2020-07-08 NOTE — FLOWSHEET NOTE
07/07/20 2304   CIWA-Ar   Nausea and Vomiting 0   Tactile Disturbances 1   Tremor 1   Auditory Disturbances 0   Paroxysmal Sweats 1   Visual Disturbances 0   Anxiety 4   Headache, Fullness in Head 1   Agitation 1   Orientation and Clouding of Sensorium 0   CIWA-Ar Total 9   Pt medicated per PRN CIWA-Ar protocol at this time- see eMAR. Pt denies further needs at this time, call light within reach. Will continue to monitor.   Burgess Lico ELLIS

## 2020-07-09 VITALS
HEIGHT: 64 IN | TEMPERATURE: 97.2 F | SYSTOLIC BLOOD PRESSURE: 111 MMHG | RESPIRATION RATE: 16 BRPM | OXYGEN SATURATION: 96 % | BODY MASS INDEX: 33.99 KG/M2 | HEART RATE: 87 BPM | WEIGHT: 199.08 LBS | DIASTOLIC BLOOD PRESSURE: 78 MMHG

## 2020-07-09 PROCEDURE — 99238 HOSP IP/OBS DSCHRG MGMT 30/<: CPT | Performed by: INTERNAL MEDICINE

## 2020-07-09 PROCEDURE — 6370000000 HC RX 637 (ALT 250 FOR IP): Performed by: INTERNAL MEDICINE

## 2020-07-09 PROCEDURE — 2580000003 HC RX 258: Performed by: INTERNAL MEDICINE

## 2020-07-09 RX ORDER — CHLORDIAZEPOXIDE HYDROCHLORIDE 10 MG/1
10 CAPSULE, GELATIN COATED ORAL 4 TIMES DAILY
Qty: 8 CAPSULE | Refills: 0 | Status: SHIPPED | OUTPATIENT
Start: 2020-07-09 | End: 2020-07-11

## 2020-07-09 RX ADMIN — MULTIPLE VITAMINS W/ MINERALS TAB 1 TABLET: TAB at 09:21

## 2020-07-09 RX ADMIN — Medication 10 ML: at 00:42

## 2020-07-09 RX ADMIN — Medication 10 ML: at 09:23

## 2020-07-09 RX ADMIN — Medication 100 MG: at 09:21

## 2020-07-09 RX ADMIN — LORAZEPAM 2 MG: 2 TABLET ORAL at 00:17

## 2020-07-09 RX ADMIN — CHLORDIAZEPOXIDE HYDROCHLORIDE 10 MG: 5 CAPSULE ORAL at 09:21

## 2020-07-09 RX ADMIN — FOLIC ACID 1 MG: 1 TABLET ORAL at 09:22

## 2020-07-09 RX ADMIN — LORAZEPAM 2 MG: 2 TABLET ORAL at 07:46

## 2020-07-09 ASSESSMENT — PAIN DESCRIPTION - LOCATION: LOCATION: HEAD

## 2020-07-09 ASSESSMENT — PAIN SCALES - GENERAL
PAINLEVEL_OUTOF10: 0
PAINLEVEL_OUTOF10: 5

## 2020-07-09 ASSESSMENT — PAIN DESCRIPTION - PAIN TYPE: TYPE: ACUTE PAIN

## 2020-07-09 NOTE — PROGRESS NOTES
Awake, walking around room. C/o feeling very anxious/agitated. CIWA score 15. Ativan given per protocol. Will re-assess in 1 hour.

## 2020-07-09 NOTE — CARE COORDINATION
DISCHARGE ORDER  Date/Time 2020 9:29 AM  Completed by: Ken Williamson, Case Management    Patient Name: Zoraida Villalba    : 1975  Admitting Diagnosis: [facility] dependence with withdrawal ([facility] [F10.239]      Admit order Date and Status: IP 2020  (verify MD's last order for status of admission)      Noted discharge order. Confirmed discharge plan with patient (A&O)  Discharge Plan: DC to home w/ IOP at [facility]. Notification call to [facility] for follow up and planning. Reviewed chart. Role of discharge planner explained and patient verbalized understanding. Discharge order is noted. Has Home O2 in place on admit:  No    Pt is being d/c'd to home today. Pt's O2 sats are 96% on RA. Discharge timeout done with Giovanna Caldwell. All discharge needs and concerns addressed.

## 2020-07-09 NOTE — PROGRESS NOTES
Admit: 2020    Name:  Ambar Argueta  Room:  0203/0203-01  MRN:    [id number]    Daily Progress Note for 2020     Interval History:   Doing well today    Scheduled Meds:   sodium chloride flush  10 mL Intravenous 2 times per day    enoxaparin  40 mg Subcutaneous Daily    thiamine  100 mg Oral Daily    therapeutic multivitamin-minerals  1 tablet Oral Daily    folic acid  1 mg Oral Daily    chlordiazePOXIDE  10 mg Oral 4x Daily       Continuous Infusions:      PRN Meds:  sodium chloride flush, potassium chloride **OR** potassium alternative oral replacement **OR** potassium chloride, magnesium sulfate, polyethylene glycol, LORazepam **OR** LORazepam **OR** LORazepam **OR** LORazepam **OR** LORazepam **OR** LORazepam **OR** LORazepam **OR** LORazepam, ibuprofen, hydrOXYzine, ondansetron                  Objective:     Temp  Av.6 °F (36.4 °C)  Min: 97.2 °F (36.2 °C)  Max: 97.9 °F (36.6 °C)  Pulse  Av.6  Min: 88  Max: 94  BP  Min: 107/80  Max: 129/95  SpO2  Av.8 %  Min: 95 %  Max: 96 %  Patient Vitals for the past 4 hrs:   BP Temp Temp src Pulse SpO2   20 0740 122/88 -- -- 90 --   20 0645 107/80 97.2 °F (36.2 °C) Oral 88 96 %         Intake/Output Summary (Last 24 hours) at 2020 0854  Last data filed at 2020 0827  Gross per 24 hour   Intake 560 ml   Output --   Net 560 ml       Physical Exam:  General:  Awake, alert and oriented. Appears to be not in any distress  Mucous Membranes:  Pink , anicteric  Neck: No JVD, no carotid bruit, no thyromegaly  Chest:  Clear to auscultation bilaterally, no added sounds  Cardiovascular:  RRR S1S2 heard, no murmurs or gallops  Abdomen:  Soft, undistended, non tender, no organomegaly, BS present  Extremities: No edema or cyanosis.  Distal pulses well felt  Neurological : no focal deficits, no tremors     Lab Data:  CBC:   Recent Labs     20  1755 20  0553   WBC 7.2 6.3   RBC 4.63 4.26   HGB 14.2 13.1   HCT 41.8 38.6   MCV 90.4 90. 6   RDW 12.7 12.8    226     BMP:   Recent Labs     07/06/20  1755 07/07/20  0553   * 136   K 3.8 3.6    103   CO2 21 21   BUN 9 10   CREATININE 0.7 0.6     BNP: No results for input(s): BNP in the last 72 hours. PT/INR:   Recent Labs     07/06/20 1755   PROTIME 10.5   INR 0.91     APTT:No results for input(s): APTT in the last 72 hours. CARDIAC ENZYMES: No results for input(s): CKMB, CKMBINDEX, TROPONINI in the last 72 hours. Invalid input(s): CKTOTAL;3  FASTING LIPID PANEL:No results found for: CHOL, HDL, TRIG  LIVER PROFILE:   Recent Labs     07/06/20 1755 07/07/20  0553   AST 84* 55*   ALT 96* 76*   BILITOT 0.3 0.6   ALKPHOS 119 103           Assessment & Plan:     Patient Active Problem List    Diagnosis Date Noted    Elevated LFTs     [facility] 07/06/2020    DDD (degenerative disc disease), cervical 02/17/2015    Cervical strain 02/17/2015     Alcohol dependence with withdrawal.   Continue oral Librium. CIWA scale with Ativan. Folate and thiamine replacement. Seizure precautions. doing well today    Elevated LFTs secondary to alcohol.     DVT prophylaxis     D/c home     Plans to go to [address] [place] [address]

## 2020-07-22 NOTE — DISCHARGE SUMMARY
Name:  Kizzy Thomson  Room:  0203/0203-01  MRN:    [id number]    Discharge Summary      This discharge summary is in conjunction with a complete physical exam done on the day of discharge. Attending Physician: CHARITY Aguirre. Discharging Physician: CHARITY Aguirre. Admit: 7/6/2020  Discharge:  7/9/2020    Diagnoses this Admission    Active Problems:    Alcohol dependence with withdrawal (HCC)    Elevated LFTs  Resolved Problems:    * No resolved hospital problems. *          Procedures (Please Review Full Report for Details)        Consults        HPI:  The patient is a 53 y.o. female with PMH below, presents with alcohol withdrawal, anxiety, tremulousness, nausea. Patient reports that she has drank approximately half 1/5 of hard liquor daily for the past 3 years. Her last drink was immediately prior to arrival at approximately 3 PM.  She reports she has never actually done full detox but gets shaky and anxious after about half a day of not drinking with prior attempts. When this happens she goes back to drinking to relieve the symptoms. She has been seen by [facility] and was referred here for medical detox. Currently describes anxiety and feeling shaky and nausea. She declined antiemetics in the ER. KAROLYN was 122 at arrival and repeat approximately 3 hours later was 34. CIWA scores have been 4 and 7 so far today. Hospital Course  Alcohol dependence with withdrawal.   Continue oral Librium. CIWA scale with Ativan. Folate and thiamine replacement. Seizure precautions. doing well today     Elevated LFTs secondary to alcohol.        D/c home      Plans to go to [facility]   Discharge Medications     Medication List      STOP taking these medications    ibuprofen 800 MG tablet  Commonly known as:  IBU        ASK your doctor about these medications    chlordiazePOXIDE 10 MG capsule  Commonly known as:  LIBRIUM  Take 1 capsule by mouth 4 times daily for 2 days.   Ask about: Should I take this